# Patient Record
Sex: FEMALE | Race: BLACK OR AFRICAN AMERICAN | Employment: UNEMPLOYED | URBAN - NONMETROPOLITAN AREA
[De-identification: names, ages, dates, MRNs, and addresses within clinical notes are randomized per-mention and may not be internally consistent; named-entity substitution may affect disease eponyms.]

---

## 2017-12-16 ENCOUNTER — APPOINTMENT (OUTPATIENT)
Dept: GENERAL RADIOLOGY | Age: 32
End: 2017-12-16
Payer: MEDICAID

## 2017-12-16 ENCOUNTER — HOSPITAL ENCOUNTER (EMERGENCY)
Age: 32
Discharge: HOME | End: 2017-12-17
Attending: EMERGENCY MEDICINE
Payer: MEDICAID

## 2017-12-16 DIAGNOSIS — Z00.00 ENCOUNTER FOR WELL ADULT EXAM WITHOUT ABNORMAL FINDINGS: Primary | ICD-10-CM

## 2017-12-16 PROCEDURE — 71010 XR CHEST PORTABLE: CPT

## 2017-12-16 PROCEDURE — 59025 FETAL NON-STRESS TEST: CPT

## 2017-12-16 PROCEDURE — 99283 EMERGENCY DEPT VISIT LOW MDM: CPT

## 2017-12-17 ENCOUNTER — APPOINTMENT (OUTPATIENT)
Dept: GENERAL RADIOLOGY | Age: 32
End: 2017-12-17
Payer: MEDICAID

## 2017-12-17 VITALS
SYSTOLIC BLOOD PRESSURE: 114 MMHG | TEMPERATURE: 98.2 F | DIASTOLIC BLOOD PRESSURE: 86 MMHG | OXYGEN SATURATION: 100 % | HEART RATE: 92 BPM | RESPIRATION RATE: 16 BRPM

## 2017-12-17 PROCEDURE — 74000 XR ABDOMEN LIMITED (KUB): CPT

## 2017-12-17 PROCEDURE — 70360 X-RAY EXAM OF NECK: CPT

## 2017-12-17 NOTE — ED PROVIDER NOTES
kevin.  Andriy Joseph radiographic images are visualized and preliminarily interpreted by the emergency physician unless otherwise stated below. LABS:   Labs Reviewed - No data to display    EMERGENCY DEPARTMENT COURSE:   Vitals:    Vitals:    12/16/17 2224 12/17/17 0046   BP: (!) 137/93 114/86   Pulse: 104 92   Resp: 16 16   Temp: 98.2 °F (36.8 °C)    TempSrc: Oral    SpO2: 100% 100%         CRITICAL CARE:       CONSULTS:  None    PROCEDURES:  None    FINAL IMPRESSION      1. Encounter for well adult exam without abnormal findings          DISPOSITION/PLAN   Decision To Discharge 33 yo female 7 months presenting with complaint of having swallowed needle by mistake. X-ray of soft neck, chest, abdomen shows no foreign object. We will discharge patient home with return precautions. Patient was evaluated by Andrés Hunt in the ER prior to discharge. PATIENT REFERRED TO:  [facility] EMERGENCY DEPT  [address] [place] [address]  [phone]    As needed, RE-CHECK AND FURTHER TESTING AS NEEDED; COME BACK IF YOU START HAVING CHEST PAIN, ABD PAIN, DIFFICULTY SWALLOWING OR BREATHING.       DISCHARGE MEDICATIONS:  Discharge Medication List as of 12/17/2017  1:33 AM          (Please note that portions of this note were completed with a voice recognition program.  Efforts were made to edit the dictations but occasionally words are mis-transcribed.)    Мария Bridges, DO Мария Bridges DO  12/17/17 0207

## 2017-12-17 NOTE — ED NOTES
Patient up and ambulating to the bathroom without any difficulty.  Patient is asking for something to drink and I am trying to explain to patient that she cannot have anything to drink until se get test results     Arash Wagner RN  12/17/17 7716

## 2017-12-17 NOTE — FLOWSHEET NOTE
Dr Natalia Zimmer returned page to unit. Informed of  29w4d staff patient in the ED due to swallowing a sewing needle. Patient previously lived in [place] and received good prenatal care. Just moved to [place] in the last few weeks, awaiting approval from Medicaid to establish OB provider. Patient reports good fetal movement, denies any vaginal bleeding or leaking of fluids. FHTs reactive, no contractions. No new orders received.

## 2017-12-17 NOTE — FLOWSHEET NOTE
RN at bedside for NST.  29w4d patient, currently here in [facility].[place] visiting family. Previously lived in [place] with good prenatal care, has moved to [facility] in the last couple weeks and states she is working on establishing an OB physician. States she is waiting to get approved for Medicaid. Presented to ED for swallowing a sewing needle accidentally. Denies any OB complaints. Reports good fetal movement, denies any vaginal bleeding or leaking of fluids.